# Patient Record
Sex: FEMALE | Race: BLACK OR AFRICAN AMERICAN | Employment: FULL TIME | URBAN - METROPOLITAN AREA
[De-identification: names, ages, dates, MRNs, and addresses within clinical notes are randomized per-mention and may not be internally consistent; named-entity substitution may affect disease eponyms.]

---

## 2023-05-19 ENCOUNTER — HOSPITAL ENCOUNTER (OUTPATIENT)
Facility: HOSPITAL | Age: 58
Discharge: HOME | End: 2023-05-19
Payer: COMMERCIAL

## 2023-05-19 ENCOUNTER — HOSPITAL ENCOUNTER (OUTPATIENT)
Facility: HOSPITAL | Age: 58
End: 2023-05-19
Payer: COMMERCIAL

## 2023-05-19 DIAGNOSIS — M47.26 OTHER SPONDYLOSIS WITH RADICULOPATHY, LUMBAR REGION: ICD-10-CM

## 2023-05-19 LAB
ALBUMIN SERPL-MCNC: 3.5 G/DL (ref 3.4–5)
ALBUMIN/GLOB SERPL: 0.9 (ref 0.8–1.7)
ALP SERPL-CCNC: 72 U/L (ref 45–117)
ALT SERPL-CCNC: 30 U/L (ref 13–56)
ANION GAP SERPL CALC-SCNC: 4 MMOL/L (ref 3–18)
APTT PPP: 22 SEC (ref 23–36.4)
AST SERPL-CCNC: 19 U/L (ref 10–38)
BILIRUB SERPL-MCNC: 0.3 MG/DL (ref 0.2–1)
BUN SERPL-MCNC: 7 MG/DL (ref 7–18)
BUN/CREAT SERPL: 13 (ref 12–20)
CALCIUM SERPL-MCNC: 8.9 MG/DL (ref 8.5–10.1)
CHLORIDE SERPL-SCNC: 105 MMOL/L (ref 100–111)
CO2 SERPL-SCNC: 30 MMOL/L (ref 21–32)
CREAT SERPL-MCNC: 0.52 MG/DL (ref 0.6–1.3)
EKG ATRIAL RATE: 74 BPM
EKG DIAGNOSIS: NORMAL
EKG P AXIS: 40 DEGREES
EKG P-R INTERVAL: 156 MS
EKG Q-T INTERVAL: 372 MS
EKG QRS DURATION: 82 MS
EKG QTC CALCULATION (BAZETT): 412 MS
EKG R AXIS: 84 DEGREES
EKG T AXIS: 28 DEGREES
EKG VENTRICULAR RATE: 74 BPM
ERYTHROCYTE [DISTWIDTH] IN BLOOD BY AUTOMATED COUNT: 11.6 % (ref 11.6–14.5)
GLOBULIN SER CALC-MCNC: 4 G/DL (ref 2–4)
GLUCOSE SERPL-MCNC: 130 MG/DL (ref 74–99)
HCT VFR BLD AUTO: 44.7 % (ref 35–45)
HGB BLD-MCNC: 14.6 G/DL (ref 12–16)
INR PPP: 0.9 (ref 0.8–1.2)
MCH RBC QN AUTO: 30.6 PG (ref 24–34)
MCHC RBC AUTO-ENTMCNC: 32.7 G/DL (ref 31–37)
MCV RBC AUTO: 93.7 FL (ref 78–100)
NRBC # BLD: 0 K/UL (ref 0–0.01)
NRBC BLD-RTO: 0 PER 100 WBC
PLATELET # BLD AUTO: 361 K/UL (ref 135–420)
PMV BLD AUTO: 9 FL (ref 9.2–11.8)
POTASSIUM SERPL-SCNC: 4 MMOL/L (ref 3.5–5.5)
PROT SERPL-MCNC: 7.5 G/DL (ref 6.4–8.2)
PROTHROMBIN TIME: 12.8 SEC (ref 11.5–15.2)
RBC # BLD AUTO: 4.77 M/UL (ref 4.2–5.3)
SODIUM SERPL-SCNC: 139 MMOL/L (ref 136–145)
WBC # BLD AUTO: 8.4 K/UL (ref 4.6–13.2)

## 2023-05-19 PROCEDURE — 93005 ELECTROCARDIOGRAM TRACING: CPT

## 2023-05-19 PROCEDURE — 85610 PROTHROMBIN TIME: CPT

## 2023-05-19 PROCEDURE — 36415 COLL VENOUS BLD VENIPUNCTURE: CPT

## 2023-05-19 PROCEDURE — 85730 THROMBOPLASTIN TIME PARTIAL: CPT

## 2023-05-19 PROCEDURE — 80053 COMPREHEN METABOLIC PANEL: CPT

## 2023-05-19 PROCEDURE — 85027 COMPLETE CBC AUTOMATED: CPT

## 2023-06-22 ENCOUNTER — HOME HEALTH ADMISSION (OUTPATIENT)
Age: 58
End: 2023-06-22
Payer: COMMERCIAL

## 2023-06-23 ENCOUNTER — HOME CARE VISIT (OUTPATIENT)
Age: 58
End: 2023-06-23

## 2023-06-23 ENCOUNTER — HOME CARE VISIT (OUTPATIENT)
Age: 58
End: 2023-06-23
Payer: COMMERCIAL

## 2023-06-23 VITALS
OXYGEN SATURATION: 99 % | RESPIRATION RATE: 18 BRPM | TEMPERATURE: 97 F | SYSTOLIC BLOOD PRESSURE: 118 MMHG | DIASTOLIC BLOOD PRESSURE: 78 MMHG | HEART RATE: 96 BPM

## 2023-06-23 PROCEDURE — G0299 HHS/HOSPICE OF RN EA 15 MIN: HCPCS

## 2023-06-23 PROCEDURE — G0151 HHCP-SERV OF PT,EA 15 MIN: HCPCS

## 2023-06-23 ASSESSMENT — ENCOUNTER SYMPTOMS
HEMOPTYSIS: 0
PAIN LOCATION - PAIN QUALITY: ACHING

## 2023-06-24 ENCOUNTER — HOME CARE VISIT (OUTPATIENT)
Age: 58
End: 2023-06-24
Payer: COMMERCIAL

## 2023-06-24 PROCEDURE — G0157 HHC PT ASSISTANT EA 15: HCPCS

## 2023-06-24 ASSESSMENT — ENCOUNTER SYMPTOMS: PAIN LOCATION - PAIN QUALITY: STABBING

## 2023-06-24 NOTE — HOME HEALTH
Subjective: My daughter called the surgeon yesterday and they prescribed a new pain medication, she is picking it up today. Caregiver involvement: Pt's daughter and son in law assist with household tasks, meal prep and care as needed. Medications reviewed and all medications are available in the home this visit. Medications are somewhat effective at this time. no new medication    Patient education provided this visit: constipation management, fall prevention, pressure relief, spine precautions, signs and symptoms of infection, pain and edema management    Patient level of understanding of education provided: Pt requires further education. Patient response to procedure performed:  Pain decreased from 8/10 to now 6/10 at end of session. Patient's Progress towards personal goals: Pt reports no falls. Upon my arrival pt sitting up on  couch w/o TLSO, assisted pt in donning TLSO. Educated pt and her son in law that she is to be wearing TLSO at all times when OOB. Pt requires vc for staying on task today, maintaining spine precautions with all mobility and breathing t/o activity. She requires increased time and effort for transitions to and from sitting and required 3 attempt to stand from couch. Pt reports she is not eating or drinking very much t/o the day. Educated pt that she needs to increase both to facilitate healing and energy for mobility and to manage issues with constipation. Pt given water bottle during session and pt began increasing hydration. Pain limiting mobility today, but they have not picked up new pain medication at this time, per pt her daughter is picking it up today. Continued need for the following skills: HHPT medically necessary to address decreased LE strength, decreased standing balance, decreased mobility and increased risk of falls. The following discharge planning was discussed with the pt/caregiver: d/c HHPT in 9 more visits.

## 2023-06-25 VITALS
OXYGEN SATURATION: 93 % | SYSTOLIC BLOOD PRESSURE: 124 MMHG | DIASTOLIC BLOOD PRESSURE: 76 MMHG | RESPIRATION RATE: 18 BRPM | HEART RATE: 98 BPM | TEMPERATURE: 98.6 F

## 2023-06-25 VITALS
TEMPERATURE: 97 F | SYSTOLIC BLOOD PRESSURE: 118 MMHG | HEART RATE: 96 BPM | OXYGEN SATURATION: 99 % | RESPIRATION RATE: 16 BRPM | DIASTOLIC BLOOD PRESSURE: 78 MMHG

## 2023-06-25 ASSESSMENT — ENCOUNTER SYMPTOMS
DYSPNEA ACTIVITY LEVEL: AFTER AMBULATING 10 - 20 FT
PAIN LOCATION - PAIN QUALITY: DULL ACHE, SHARP
CONSTIPATION: 1

## 2023-06-26 ENCOUNTER — HOME CARE VISIT (OUTPATIENT)
Age: 58
End: 2023-06-26
Payer: COMMERCIAL

## 2023-06-26 VITALS
TEMPERATURE: 97.9 F | OXYGEN SATURATION: 98 % | HEART RATE: 83 BPM | SYSTOLIC BLOOD PRESSURE: 130 MMHG | RESPIRATION RATE: 17 BRPM | DIASTOLIC BLOOD PRESSURE: 82 MMHG

## 2023-06-26 PROCEDURE — G0157 HHC PT ASSISTANT EA 15: HCPCS

## 2023-06-26 ASSESSMENT — ENCOUNTER SYMPTOMS: PAIN LOCATION - PAIN QUALITY: SHARP, SHOOTING

## 2023-06-27 ENCOUNTER — HOME CARE VISIT (OUTPATIENT)
Age: 58
End: 2023-06-27
Payer: COMMERCIAL

## 2023-06-27 PROCEDURE — G0299 HHS/HOSPICE OF RN EA 15 MIN: HCPCS

## 2023-06-28 ENCOUNTER — HOME CARE VISIT (OUTPATIENT)
Age: 58
End: 2023-06-28
Payer: COMMERCIAL

## 2023-06-28 VITALS
SYSTOLIC BLOOD PRESSURE: 118 MMHG | TEMPERATURE: 98.2 F | OXYGEN SATURATION: 99 % | HEART RATE: 78 BPM | RESPIRATION RATE: 18 BRPM | DIASTOLIC BLOOD PRESSURE: 72 MMHG

## 2023-06-28 VITALS
SYSTOLIC BLOOD PRESSURE: 142 MMHG | HEART RATE: 94 BPM | TEMPERATURE: 97.2 F | DIASTOLIC BLOOD PRESSURE: 89 MMHG | RESPIRATION RATE: 17 BRPM | OXYGEN SATURATION: 95 %

## 2023-06-28 PROCEDURE — G0157 HHC PT ASSISTANT EA 15: HCPCS

## 2023-06-28 ASSESSMENT — ENCOUNTER SYMPTOMS: PAIN LOCATION - PAIN QUALITY: SHARP, SHOOTING

## 2023-06-30 ENCOUNTER — HOME CARE VISIT (OUTPATIENT)
Age: 58
End: 2023-06-30
Payer: COMMERCIAL

## 2023-06-30 VITALS
RESPIRATION RATE: 17 BRPM | SYSTOLIC BLOOD PRESSURE: 130 MMHG | DIASTOLIC BLOOD PRESSURE: 88 MMHG | TEMPERATURE: 98.1 F | OXYGEN SATURATION: 97 % | HEART RATE: 94 BPM

## 2023-06-30 PROCEDURE — G0299 HHS/HOSPICE OF RN EA 15 MIN: HCPCS

## 2023-06-30 PROCEDURE — G0157 HHC PT ASSISTANT EA 15: HCPCS

## 2023-06-30 ASSESSMENT — ENCOUNTER SYMPTOMS: PAIN LOCATION - PAIN QUALITY: SHARP, ACHY

## 2023-07-03 ENCOUNTER — HOME CARE VISIT (OUTPATIENT)
Age: 58
End: 2023-07-03
Payer: COMMERCIAL

## 2023-07-03 PROCEDURE — G0157 HHC PT ASSISTANT EA 15: HCPCS

## 2023-07-03 PROCEDURE — G0299 HHS/HOSPICE OF RN EA 15 MIN: HCPCS

## 2023-07-04 VITALS
OXYGEN SATURATION: 98 % | SYSTOLIC BLOOD PRESSURE: 130 MMHG | TEMPERATURE: 98.1 F | DIASTOLIC BLOOD PRESSURE: 82 MMHG | RESPIRATION RATE: 17 BRPM | HEART RATE: 100 BPM

## 2023-07-04 ASSESSMENT — ENCOUNTER SYMPTOMS
PAIN LOCATION - PAIN QUALITY: SORE
PAIN LOCATION - PAIN QUALITY: SHARP

## 2023-07-04 NOTE — HOME HEALTH
SUBJECTIVE: Patient reports that she is doing pretty well but still having pain, especially when she walks with increased pain down right leg. CAREGIVER INVOLVEMENT/ASSISTANCE NEEDED FOR: Daughter and sonis present and assists with IADLs as needed . OBJECTIVE: See interventions. PATIENT EDUCATION PROVIDED THIS VISIT: HEP program, gait training, transfer training  PATIENT RESPONSE TO EDUCATION PROVIDED: Patient verbalized understanding and return demonstration of techniques   PATIENT RESPONSE TO TREATMENT: Patient reports 4/10 JERICA RPE post ther ex, 4/10 JERICA RPE post gait/transfers and 8/10 pain levels post treatment. ASSESSMENT OF PROGRESS TOWARD GOALS: Patient is making progress toward goals noted by improvements in gait distance but limited significantly by pain. Patient would benefit from continued skilled PT to improve independent mobility, reduce pain. Patient to see surgeon on 6/5/23 for follow up. PLAN FOR NEXT VISIT: gait training, transfer training, bed mobility training. THE FOLLOWING DISCHARGE PLANNING WAS DISCUSSED WITH THE PATIENT/CAREGIVER: 2w1. 3w1 with discharge to self and MD following.  Patient verbalized understanding and in agreement with discharge plan

## 2023-07-06 ENCOUNTER — HOME CARE VISIT (OUTPATIENT)
Age: 58
End: 2023-07-06
Payer: COMMERCIAL

## 2023-07-06 VITALS
RESPIRATION RATE: 17 BRPM | DIASTOLIC BLOOD PRESSURE: 82 MMHG | OXYGEN SATURATION: 100 % | HEART RATE: 99 BPM | SYSTOLIC BLOOD PRESSURE: 122 MMHG | TEMPERATURE: 97.1 F

## 2023-07-06 PROCEDURE — G0157 HHC PT ASSISTANT EA 15: HCPCS

## 2023-07-06 ASSESSMENT — ENCOUNTER SYMPTOMS: PAIN LOCATION - PAIN QUALITY: SHARP, SHOOTING

## 2023-07-06 NOTE — HOME HEALTH
SUBJECTIVE: Patient reports that she is doing better. Her MD appointment went well. Reports that everything looks good. No medication changes. Patient reports making it to the car using a chair intermittently to sit down. CAREGIVER INVOLVEMENT/ASSISTANCE NEEDED FOR: Daughter and sonis present and assists with IADLs as needed . OBJECTIVE: See interventions. PATIENT EDUCATION PROVIDED THIS VISIT: HEP program, gait training, pain management, ice pack use. PATIENT RESPONSE TO EDUCATION PROVIDED: Patient verbalized understanding and return demonstration of techniques   PATIENT RESPONSE TO TREATMENT: Patient reports 4/10 JERICA RPE post ther ex, 4/10 JERICA RPE post gait/transfers and 6/10 pain levels post treatment. ASSESSMENT OF PROGRESS TOWARD GOALS: Patient demonstrates significant improvements toward goals on this date noted by improvements in gait distance and attempts with reduced pain levels during activities. Patient is limited secondary to pain, impaired gait and balance, decreased strength. Patient would benefit from continued skilled PT to advance gait distane to limited household distances to reach bathroom and living spaces, and advance to outdoor ambulation without stops. PLAN FOR NEXT VISIT: gait training, balance training, HEP program.   THE FOLLOWING DISCHARGE PLANNING WAS DISCUSSED WITH THE PATIENT/CAREGIVER: 1w1. 3w1 with discharge to self and MD following.  Patient verbalized understanding and in agreement with discharge plan

## 2023-07-07 ENCOUNTER — HOME CARE VISIT (OUTPATIENT)
Age: 58
End: 2023-07-07
Payer: COMMERCIAL

## 2023-07-07 VITALS
SYSTOLIC BLOOD PRESSURE: 130 MMHG | OXYGEN SATURATION: 98 % | RESPIRATION RATE: 17 BRPM | DIASTOLIC BLOOD PRESSURE: 82 MMHG | HEART RATE: 82 BPM | TEMPERATURE: 98.1 F

## 2023-07-07 VITALS
RESPIRATION RATE: 17 BRPM | DIASTOLIC BLOOD PRESSURE: 88 MMHG | TEMPERATURE: 98 F | SYSTOLIC BLOOD PRESSURE: 120 MMHG | OXYGEN SATURATION: 95 % | HEART RATE: 96 BPM

## 2023-07-07 PROCEDURE — G0157 HHC PT ASSISTANT EA 15: HCPCS

## 2023-07-07 ASSESSMENT — ENCOUNTER SYMPTOMS
PAIN LOCATION - PAIN QUALITY: SHARP
PAIN LOCATION - PAIN QUALITY: SORE

## 2023-07-07 NOTE — HOME HEALTH
SUBJECTIVE: Patient reports that she had a fall yesterday. Nofitied MD via telephone. Reports that she is now having increased pain and now can't make it to the bathroom. CAREGIVER INVOLVEMENT/ASSISTANCE NEEDED FOR: Daughter and sonis present and assists with IADLs as needed . OBJECTIVE: See interventions. PATIENT EDUCATION PROVIDED THIS VISIT: Safety awareness and fall prevention strategies, pain management, ice pack use x 10-20 minutes. PATIENT RESPONSE TO EDUCATION PROVIDED: Patient verbalized understanding and return demonstration of techniques   PATIENT RESPONSE TO TREATMENT: Patient reports 3/10 JERICA RPE post ther ex, 5/10 JERICA RPE post gait/transfers and 6/10 pain levels post treatment. ASSESSMENT OF PROGRESS TOWARD GOALS: patient has been limited on this date due to increased pain following fall. Patient unable to ambulate successfully due to increased pain. Notified MD regarding fall. Education provided for ice pack use, pain management. Patient would benfit from continued skilled PT to improve independent mobility, improve transfer assistance needs advancing to bathroom use, improve gait distance in order to enter/exit home. PLAN FOR NEXT VISIT: gait training, transfer training  THE FOLLOWING DISCHARGE PLANNING WAS DISCUSSED WITH THE PATIENT/CAREGIVER:  3w1 with discharge to self and MD following.  Patient verbalized understanding and in agreement with discharge plan

## 2023-07-07 NOTE — HOME HEALTH
Skilled reason for visit: SN assessment, education    Caregiver involvement: Caregiver provides for personal care, ADL's, housekeeping, meal prep. .    Medications reviewed and all medications are available in the home this visit. The following education was provided regarding medications:  oxycodone. MD notified of any discrepancies/look a-like medications/medication interactions: none  Medications are effective at this time. Home health supplies by type and quantity ordered/delivered this visit include: none this visit    Patient education provided this visit: Pain control. Patient stated that she was seeing the surgeon and didn't need SN to call about pain this visit    Sharps education provided: NA    Patient level of understanding of education provided: Patient states that she understands    Patient response to procedure performed:  With great difficulty, increased pain    Agency Progress toward goals: slow progress d/t to pain     Patient's Progress towards personal goals: progressing    Home exercise program: performs    Continued need for the following skills: Nursing. Plan for next visit:wound care    Patient and/or caregiver notified and agrees to changes in the Plan of Care: Yes. The following discharge planning was discussed with the pt/caregiver: Discharge to home/self when goals are met and patient is able to care for medical needs by contacting primary care or when to seek emergency care. Patient just worked with PT and had increased pain. She requested no dressing change this visit.  She stataed her daughter is a nurse and will change it later when she feels better

## 2023-07-10 ENCOUNTER — HOME CARE VISIT (OUTPATIENT)
Age: 58
End: 2023-07-10
Payer: COMMERCIAL

## 2023-07-10 VITALS
RESPIRATION RATE: 17 BRPM | SYSTOLIC BLOOD PRESSURE: 122 MMHG | HEART RATE: 104 BPM | TEMPERATURE: 98.1 F | DIASTOLIC BLOOD PRESSURE: 89 MMHG | OXYGEN SATURATION: 98 %

## 2023-07-10 PROCEDURE — G0299 HHS/HOSPICE OF RN EA 15 MIN: HCPCS

## 2023-07-10 PROCEDURE — G0157 HHC PT ASSISTANT EA 15: HCPCS

## 2023-07-10 ASSESSMENT — ENCOUNTER SYMPTOMS: PAIN LOCATION - PAIN QUALITY: SORE

## 2023-07-10 NOTE — HOME HEALTH
SUBJECTIVE: Patient reports that she has been going to the bathroom. She is able to move a little better. CAREGIVER INVOLVEMENT/ASSISTANCE NEEDED FOR: Daughter and sonis present and assists with IADLs as needed . OBJECTIVE: See interventions. PATIENT EDUCATION PROVIDED THIS VISIT:  gait training, ther ex  PATIENT RESPONSE TO EDUCATION PROVIDED: Patient verbalized understanding and return demonstration of techniques   PATIENT RESPONSE TO TREATMENT: Patient reports 3/10 EJRICA RPE post ther ex, 4/10 JERICA RPE post first attempt at ambulation, 7/10 JERICA RPE post second attempt at ambulation and 6/10 stinging pain levels post treatment. ASSESSMENT OF PROGRESS TOWARD GOALS: Patient is making progress toward goals noted by improvements in gait distance and transfer assistance improving to mod I with transfers meeting transfer goals. Bed Mobility demonstrates mod I with technique and body mechanics meeting bed mobility goal. Patient is limited secondary to pain levels. Patient would benefit from continued skilled PT to address gait training, step negotiation to exit home. PLAN FOR NEXT VISIT: gait training, transfer training, functional testing. THE FOLLOWING DISCHARGE PLANNING WAS DISCUSSED WITH THE PATIENT/CAREGIVER: 2w1 with discharge to self and MD following.  Patient verbalized understanding and in agreement with discharge plan

## 2023-07-11 ENCOUNTER — HOME CARE VISIT (OUTPATIENT)
Age: 58
End: 2023-07-11
Payer: COMMERCIAL

## 2023-07-12 ENCOUNTER — HOME CARE VISIT (OUTPATIENT)
Age: 58
End: 2023-07-12
Payer: COMMERCIAL

## 2023-07-12 VITALS
RESPIRATION RATE: 16 BRPM | HEART RATE: 94 BPM | TEMPERATURE: 97.1 F | DIASTOLIC BLOOD PRESSURE: 78 MMHG | SYSTOLIC BLOOD PRESSURE: 118 MMHG | OXYGEN SATURATION: 99 %

## 2023-07-12 PROCEDURE — G0157 HHC PT ASSISTANT EA 15: HCPCS

## 2023-07-12 ASSESSMENT — ENCOUNTER SYMPTOMS: PAIN LOCATION - PAIN QUALITY: SORE

## 2023-07-12 NOTE — HOME HEALTH
SUBJECTIVE: Patient reports that she is doing ok. CAREGIVER INVOLVEMENT/ASSISTANCE NEEDED FOR: Daughter and sonis present and assists with IADLs as needed . OBJECTIVE: See interventions. PATIENT EDUCATION PROVIDED THIS VISIT:    PATIENT RESPONSE TO EDUCATION PROVIDED: Patient verbalized understanding and return demonstration of techniques   PATIENT RESPONSE TO TREATMENT: Patient reports 3/10 JERICA RPE post ther ex, 3/10 JERICA RPE post first attempt at ambulation, 5/10 JERICA RPE post second attempt at ambulation and 6/10 stinging pain levels post treatment. ASSESSMENT OF PROGRESS TOWARD GOALS: Patient has partially completed goals meeting transfer and bed mobility goals, partial completion of gait distance and assistance goals, patient did not meet stair negotiation due to inability to complete due to pain. Patient would benefit from continued skilled PT due to need to complete stairs and outdoor gait training. Patient has declined further therapy and would like to continue on her own. PLAN FOR NEXT VISIT: discharge from skilled PT  THE FOLLOWING DISCHARGE PLANNING WAS DISCUSSED WITH THE PATIENT/CAREGIVER: 1w1 with discharge to self and MD following. Patient verbalized understanding and in agreement with discharge plan  Assessment and Summary of Care:  Patient's current functional status before discharge is as follows  Strength: FTSTS: 35 seconds with ue support  ROM:  WFL  Bed Mobility: independent  Transfers: mod I demonstrating appropriate body mechanics  Gait/WC mobility: 60 feet with FWW and mod I with indoor surfaces. Unable to reach outdoors due to increased pain. Outdoor gait training never completed. Stairs: Unable to complete due to pain. Special Tests: TInetti: 21/28 indicating a moderate risk for falls. Recommendations: Recommend continued HHPT due to inability to complete stairs and outdoor gait training but patient has declined and would prefere to continue independently.

## 2023-07-14 ENCOUNTER — HOME CARE VISIT (OUTPATIENT)
Age: 58
End: 2023-07-14
Payer: COMMERCIAL

## 2023-07-14 VITALS
HEART RATE: 104 BPM | OXYGEN SATURATION: 98 % | SYSTOLIC BLOOD PRESSURE: 122 MMHG | DIASTOLIC BLOOD PRESSURE: 89 MMHG | RESPIRATION RATE: 18 BRPM | TEMPERATURE: 98.1 F

## 2023-07-14 PROCEDURE — G0151 HHCP-SERV OF PT,EA 15 MIN: HCPCS

## 2023-07-14 ASSESSMENT — ENCOUNTER SYMPTOMS: PAIN LOCATION - PAIN QUALITY: SHARP

## 2023-07-15 VITALS
DIASTOLIC BLOOD PRESSURE: 90 MMHG | HEART RATE: 93 BPM | RESPIRATION RATE: 16 BRPM | SYSTOLIC BLOOD PRESSURE: 128 MMHG | OXYGEN SATURATION: 95 % | TEMPERATURE: 98.2 F

## 2023-07-15 ASSESSMENT — ENCOUNTER SYMPTOMS: PAIN LOCATION - PAIN QUALITY: ACHE

## 2023-07-15 NOTE — CASE COMMUNICATION
Angela Carolina received skilled PT and RN s/p laminectomy L5-S1. This patient has currently met maximum potential with in home therapy and states she is ready for DC from in home therapy at this time. She was making progress towards goals and is now MI with transfers, ambulating 61 ft MI with a FWW and she met the Tinetti  goal 21/28. She was offered extension of home PT to meet stated goals in POC but declined at this time. She was enc ouraged to discuss outpatient PT with Dr. Marge Briones if she felt she needed more therapy down the road but she does have a daily HEP to continue and was instructed to increase walking time/distance as able as part of her therapy program. RN goals met for wound care, post-op instructional care and medication management. Patient verbalized understanding of all discharge instructions and is in agreement with discharge this visit.

## 2023-07-15 NOTE — HOME HEALTH
S: Patient stated she has good days and bad but felt she did not need additional home PT and had exercises she would continue on her own. O: PAIN: see pain tab   WOUND: see wound addendum   ROM: patient able to teach back lumbar precautions and states she is using her bone stimulator 30 minutes daily. STRENGTH:WFL   BED MOBILITY: I   EQUIPMENT: FWW, LSO, 3+1 commode, bone stimulator  TRANSFERS:MI  GAIT: 60 ft MI with FWW. Discussed use of cane when ready and how to adjust, use in L hand and sequencing. Patient does not currently have a cane and feels safest with her walker. STEPS: patient states she is not climbing steps to second floor and she is MI with one step to exit home   BALANCE: Tinetti 21/28 and patient has been free from falls   A:ASSESSMENT AND PROGRESS TOWARD GOALS:Judith Munroe received skilled PT and RN s/p laminectomy L5-S1. This patient has currently met maximum potential with in home therapy and states she is ready for DC from in home therapy at this time. She was making progress towards goals and is now MI with transfers, ambulating 61 ft MI with a FWW and she met the Tinetti  goal 21/28. She was offered extension of home PT to meet stated goals in POC but declined at this time. She was encouraged to discuss outpatient PT with Dr. Morgan Loco if she felt she needed more therapy down the road but she does have a daily HEP to continue and was instructed to increase walking time/distance as able as part of her therapy program. RN goals met for wound care, post-op instructional care and medication management. Patient verbalized understanding of all discharge instructions and is in agreement with discharge this visit. P: DC    Discharge medication list reconciled with patient and caregiver. Questions regarding medications answered and patient/caregiver advised to refer to MD for any medication questions after discharge.   2. Patient to continue use of the following assistive device for maximum